# Patient Record
Sex: MALE | Race: WHITE | NOT HISPANIC OR LATINO | Employment: OTHER | URBAN - METROPOLITAN AREA
[De-identification: names, ages, dates, MRNs, and addresses within clinical notes are randomized per-mention and may not be internally consistent; named-entity substitution may affect disease eponyms.]

---

## 2018-01-31 ENCOUNTER — OFFICE VISIT (OUTPATIENT)
Dept: UROLOGY | Facility: CLINIC | Age: 83
End: 2018-01-31
Payer: MEDICARE

## 2018-01-31 VITALS
HEIGHT: 72 IN | HEART RATE: 82 BPM | WEIGHT: 193.31 LBS | SYSTOLIC BLOOD PRESSURE: 179 MMHG | BODY MASS INDEX: 26.18 KG/M2 | DIASTOLIC BLOOD PRESSURE: 78 MMHG

## 2018-01-31 DIAGNOSIS — C61 PROSTATE CA: Primary | ICD-10-CM

## 2018-01-31 DIAGNOSIS — C61 MALIGNANT NEOPLASM OF PROSTATE: Primary | ICD-10-CM

## 2018-01-31 LAB
BILIRUB SERPL-MCNC: NORMAL MG/DL
BLOOD URINE, POC: NORMAL
COLOR, POC UA: YELLOW
GLUCOSE UR QL STRIP: NORMAL
KETONES UR QL STRIP: NORMAL
LEUKOCYTE ESTERASE URINE, POC: NORMAL
NITRITE, POC UA: NORMAL
PH, POC UA: 7
PROTEIN, POC: NORMAL
SPECIFIC GRAVITY, POC UA: 1.01
UROBILINOGEN, POC UA: NORMAL

## 2018-01-31 PROCEDURE — 1159F MED LIST DOCD IN RCRD: CPT | Mod: ,,, | Performed by: UROLOGY

## 2018-01-31 PROCEDURE — 99204 OFFICE O/P NEW MOD 45 MIN: CPT | Mod: S$PBB,25,, | Performed by: UROLOGY

## 2018-01-31 PROCEDURE — 96402 CHEMO HORMON ANTINEOPL SQ/IM: CPT | Mod: ,,, | Performed by: UROLOGY

## 2018-01-31 PROCEDURE — 99203 OFFICE O/P NEW LOW 30 MIN: CPT | Mod: PBBFAC,PO | Performed by: UROLOGY

## 2018-01-31 PROCEDURE — 99999 PR PBB SHADOW E&M-NEW PATIENT-LVL III: CPT | Mod: PBBFAC,,, | Performed by: UROLOGY

## 2018-01-31 PROCEDURE — 81002 URINALYSIS NONAUTO W/O SCOPE: CPT | Mod: PBBFAC,PO | Performed by: UROLOGY

## 2018-01-31 PROCEDURE — 1126F AMNT PAIN NOTED NONE PRSNT: CPT | Mod: ,,, | Performed by: UROLOGY

## 2018-01-31 RX ADMIN — TRIPTORELIN PAMOATE: KIT at 01:01

## 2018-01-31 NOTE — PROGRESS NOTES
UROLOGY [place]  1 31 18    c-c prostate ca    Age 89, comes in accompanied by his daughter in law, job. Pt was a pt of dr josie santana since 2006. Pt was found to have a very high psa level and has been on lupron shots every 3 months.     On feb 8, 2012 he had a cystoscopy for gross hematuria and it was seen that the source of bleeding was the prostate. At the time he had a prostate needle biopsy and the path report from [facility] described hyalinized prostatic stroma with atypical hyperplasia, and intraepithelial neoplasia, but no invasive carcinoma.     psa level on 9 20 10 was 1480  nov 2010 it was 30  mar 2011 it was 2  may 2014 it was 22  dec 2014 it was 60  jan 2015 it was 78  aug 2017 it was 38.    PMH    Surgical:  has a past surgical history that includes Hip pinning and Coronary stent placement.    Medical:  has a past medical history of Diabetes; Elevated cholesterol; Hypertension; Hypothyroid; and Malignant neoplasm of prostate.    Familial: sister and mother had diabetes    Social: retired , , lives in [place]    Current Outpatient Prescriptions on File Prior to Visit   Medication Sig Dispense Refill    amlodipine (NORVASC) 10 MG tablet TAKE ONE TABLET BY MOUTH DAILY 90 tablet 3    dutasteride (AVODART) 0.5 mg capsule TAKE ONE CAPS 30 capsule 9    fenofibrate 160 MG Tab TAKE ONE  30 tablet 3    ferrous sulfate 325 (65 FE) MG EC tablet Take 325 mg by mouth once daily.      hydrALAZINE (APRESOLINE) 50 MG tablet Take 1 tablet (50 mg total) by mouth 3 . 90 tablet 11    levothyroxine (SYNTHROID) 25 MCG tablet Take 1 tablet (25 mcg total) by mouth 30 tablet 6    metoprolol tartrate (LOPRESSOR) 50 MG tablet TAKE ONE-HALF TABLET 30 tablet 3    multivitamin capsule Take 1      nateglinide (STARLIX) 120 MG tablet Take 120 mg by mouth 2 (tw      simvastatin (ZOCOR) 20 MG tablet TAKE ONE TABLET BY MOUTH DAILY 30 tablet 10     Pt alert, hard of hearing, no distress  HEENT:   wnl.  Neck: supple, no JVD, no lymphadenopathy  Chest: CV NSR  Lungs: normal chest expansion  Abdomen flat, nontender, no organomegaly, no masses.  No hernias  Penis nl, meatus nl  Testes nl, epi nl, scrotum nl  NICOLLE: anus nl, sphincter nl tone, mucosa without lesions, prostate flat, no nodules or indurations  Extremities: no edema, peripheral pulses nl  Neuro: preserved    IMP  Prostate cancer, metastatic  Has done well for many years on lupron injections, which he gets every 3 months.  We did not find a tissue confirmation of malignancy in the path report, however the value of psa is pathognomonic of prostate cancer and pt will become 89 in 3 months.  I suggest we continue with treatment with trelstar 3-month preparation  Will update the psa today  May eventually request dr munoz's opinion                                              Trelstar 11.25 mg given by me in Right Gluteus IM.

## 2018-05-03 ENCOUNTER — TELEPHONE (OUTPATIENT)
Dept: UROLOGY | Facility: CLINIC | Age: 83
End: 2018-05-03

## 2018-05-03 NOTE — TELEPHONE ENCOUNTER
----- Message from Sujata Gilliam sent at 5/3/2018  8:53 AM CDT -----  Contact: Tania Carvalho,Elizabeth Carvalho,Elizabeth calling states the pt is there and told them that the Dr wants him to do labs,but they have no orders,pt told them that the office was faxing it to them already....[phone] ext [phone]

## 2018-05-08 ENCOUNTER — OFFICE VISIT (OUTPATIENT)
Dept: UROLOGY | Facility: CLINIC | Age: 83
End: 2018-05-08
Payer: MEDICARE

## 2018-05-08 VITALS
RESPIRATION RATE: 18 BRPM | SYSTOLIC BLOOD PRESSURE: 181 MMHG | BODY MASS INDEX: 25.32 KG/M2 | WEIGHT: 186.94 LBS | DIASTOLIC BLOOD PRESSURE: 75 MMHG | HEART RATE: 71 BPM | HEIGHT: 72 IN

## 2018-05-08 DIAGNOSIS — C61 PROSTATE CA: Primary | ICD-10-CM

## 2018-05-08 PROCEDURE — 99999 PR PBB SHADOW E&M-EST. PATIENT-LVL III: CPT | Mod: PBBFAC,,, | Performed by: UROLOGY

## 2018-05-08 PROCEDURE — 96402 CHEMO HORMON ANTINEOPL SQ/IM: CPT | Mod: S$PBB,,, | Performed by: UROLOGY

## 2018-05-08 PROCEDURE — 99213 OFFICE O/P EST LOW 20 MIN: CPT | Mod: S$PBB,25,, | Performed by: UROLOGY

## 2018-05-08 PROCEDURE — 99213 OFFICE O/P EST LOW 20 MIN: CPT | Mod: PBBFAC,PO | Performed by: UROLOGY

## 2018-05-08 RX ADMIN — TRIPTORELIN PAMOATE: KIT at 11:05

## 2018-05-08 NOTE — PROGRESS NOTES
UROLOGY [place]  5 8 18    C=c follow up prostate ca    Age 89, comes in accompanied by his daughter in law, job. Pt was a pt of dr josie santana since 2006. Pt was found to have a very high psa level and has been on lupron shots every 3 months.      On feb 8, 2012 he had a cystoscopy for gross hematuria and it was seen that the source of bleeding was the prostate. At the time he had a prostate needle biopsy and the path report from [facility] described hyalinized prostatic stroma with atypical hyperplasia, and intraepithelial neoplasia, but no invasive carcinoma.      psa level on 9 20 10 was 1480  nov 2010 it was 30  mar 2011 it was 2  may 2014 it was 22  dec 2014 it was 60  jan 2015 it was 78  aug 2017 it was 38.     PMH     Surgical:  has a past surgical history that includes Hip pinning and Coronary stent placement.     Medical:  has a past medical history of Diabetes; Elevated cholesterol; Hypertension; Hypothyroid; and Malignant neoplasm of prostate.     Familial: sister and mother had diabetes     Social: retired , , lives in [place]            Current Outpatient Prescriptions on File Prior to Visit   Medication Sig Dispense Refill    amlodipine (NORVASC) 10 MG tablet TAKE ONE TABLET BY MOUTH DAILY 90 tablet 3    dutasteride (AVODART) 0.5 mg capsule TAKE ONE CAPS 30 capsule 9    fenofibrate 160 MG Tab TAKE ONE  30 tablet 3    ferrous sulfate 325 (65 FE) MG EC tablet Take 325 mg by mouth once daily.        hydrALAZINE (APRESOLINE) 50 MG tablet Take 1 tablet (50 mg total) by mouth 3 . 90 tablet 11    levothyroxine (SYNTHROID) 25 MCG tablet Take 1 tablet (25 mcg total) by mouth 30 tablet 6    metoprolol tartrate (LOPRESSOR) 50 MG tablet TAKE ONE-HALF TABLET 30 tablet 3    multivitamin capsule Take 1        nateglinide (STARLIX) 120 MG tablet Take 120 mg by mouth 2 (tw        simvastatin (ZOCOR) 20 MG tablet TAKE ONE TABLET BY MOUTH DAILY 30 tablet 10      Pt alert, hard of hearing,  no distress  HEENT:  wnl.  Neck: supple, no JVD, no lymphadenopathy  Chest: CV NSR  Lungs: normal chest expansion  Abdomen flat, nontender, no organomegaly, no masses.  No hernias  Penis nl, meatus nl  Testes nl, epi nl, scrotum nl  NICOLLE: anus nl, sphincter nl tone, mucosa without lesions, prostate flat, no nodules or indurations  Extremities: no edema, peripheral pulses nl  Neuro: preserved     IMP  Prostate cancer, metastatic  Has done well for many years on lupron injections, which he gets every 3 months.  We did not find a tissue confirmation of malignancy in the path report, however the value of psa is pathognomonic of prostate cancer  I suggest we continue with treatment with trelstar 3-month preparation  psa was updated earlier this month (5 3 18) and it was 1.97 ([facility] in [place], [place])  May eventually request dr munoz's opinion       Trelstar 11.25 mg (three month preparation) given by me in right gluteus without problems.

## 2018-07-24 ENCOUNTER — TELEPHONE (OUTPATIENT)
Dept: UROLOGY | Facility: CLINIC | Age: 83
End: 2018-07-24

## 2018-07-24 NOTE — TELEPHONE ENCOUNTER
----- Message from Ila Murphy sent at 7/24/2018 10:58 AM CDT -----  Contact: Pts daughter in law  Radha Garcia is calling to reschedule pts injection. She can be reached at [phone]

## 2018-08-21 ENCOUNTER — OFFICE VISIT (OUTPATIENT)
Dept: UROLOGY | Facility: CLINIC | Age: 83
End: 2018-08-21
Payer: MEDICARE

## 2018-08-21 DIAGNOSIS — C61 PROSTATE CA: Primary | ICD-10-CM

## 2018-08-21 PROCEDURE — 99999 PR PBB SHADOW E&M-EST. PATIENT-LVL II: CPT | Mod: PBBFAC,,, | Performed by: UROLOGY

## 2018-08-21 PROCEDURE — 99213 OFFICE O/P EST LOW 20 MIN: CPT | Mod: S$PBB,25,, | Performed by: UROLOGY

## 2018-08-21 PROCEDURE — 96402 CHEMO HORMON ANTINEOPL SQ/IM: CPT | Mod: S$PBB,,, | Performed by: UROLOGY

## 2018-08-21 PROCEDURE — 99212 OFFICE O/P EST SF 10 MIN: CPT | Mod: PBBFAC,PO | Performed by: UROLOGY

## 2018-08-21 RX ORDER — IBUPROFEN 200 MG
1 CAPSULE ORAL
COMMUNITY
End: 2018-12-19

## 2018-08-21 RX ORDER — NAPROXEN SODIUM 220 MG/1
81 TABLET, FILM COATED ORAL
COMMUNITY
End: 2018-12-19

## 2018-08-21 RX ADMIN — TRIPTORELIN PAMOATE 11.25 MG: KIT at 11:08

## 2018-08-21 NOTE — PROGRESS NOTES
UROLOGY [place]  8 21 18       C=c follow up prostate ca     Age 89, was here 3 months ago for his last injection of 3-month trelstar.     Pt was followed by dr josie santana since 2006. Pt was found to have a very high psa level and has been on lupron shots every 3 months.      On feb 8, 2012 he had a cystoscopy for gross hematuria and it was seen that the source of bleeding was the prostate. At the time he had a prostate needle biopsy and the path report from [facility] described hyalinized prostatic stroma with atypical hyperplasia, and intraepithelial neoplasia, but no invasive carcinoma.      psa level on 9 20 10 was 1480  nov 2010 it was 30  mar 2011 it was 2  may 2014 it was 22  dec 2014 it was 60  jan 2015 it was 78  aug 2017 it was 38.     PMH     Surgical:  has a past surgical history that includes Hip pinning and Coronary stent placement.     Medical:  has a past medical history of Diabetes; Elevated cholesterol; Hypertension; Hypothyroid; and Malignant neoplasm of prostate.     Familial: sister and mother had diabetes     Social: retired , , lives in [place]                 Current Outpatient Prescriptions on File Prior to Visit   Medication Sig Dispense Refill    amlodipine (NORVASC) 10 MG  TAKE ONE TABLET  90 tablet 3    dutasteride (AVODART) 0.5  TAKE ONE CAPS 30 capsule 9    fenofibrate 160 MG Tab TAKE ONE  30 tablet 3    ferrous sulfate 325 (65 FE) M Take 325 mg b        hydrALAZINE (APRESOLINE) 50 Take 1 tablet (50 mg  90 tablet 11    levothyroxine (SYNTHROID) 2 Take 1 tablet (25 mc 30 tablet 6    metoprolol tartrate (LOPRESSOR) 50 MG tablet TAKE ONE-HALF TABLET 30 tablet 3    multivitamin capsule Take 1        nateglinide (STARLIX) 120 Take 120 m        simvastatin (ZOCOR) 20  TAKE ONE TABLET 30 tablet 10      Pt alert, hard of hearing, no distress  HEENT:  wnl.  Neck: supple, no JVD, no lymphadenopathy  Chest: CV NSR  Lungs: normal chest expansion  Abdomen flat,  nontender, no organomegaly, no masses.  No hernias  Penis nl, meatus nl  Testes nl, epi nl, scrotum nl  NICOLLE: anus nl, sphincter nl tone, mucosa without lesions, prostate flat, no nodules or indurations  Extremities: no edema, peripheral pulses nl  Neuro: preserved     IMP  Prostate cancer, metastatic  Has done well for many years on lupron injections, which he gets every 3 months.  We did not find a tissue confirmation of malignancy in the path report, however the value of psa is pathognomonic of prostate cancer  I suggest we continue with treatment with trelstar 3-month preparation  psa was updated earlier this year (5 3 18) and it was 1.97 ([facility] in [place], [place])  May eventually request dr munoz's opinion      Trelstar 11.25 mg (three month preparation) given by me in right gluteus without problems.  psa update for next time

## 2018-09-25 ENCOUNTER — TELEPHONE (OUTPATIENT)
Dept: UROLOGY | Facility: CLINIC | Age: 83
End: 2018-09-25

## 2018-09-25 DIAGNOSIS — C61 MALIGNANT NEOPLASM OF PROSTATE: Primary | ICD-10-CM

## 2018-09-25 NOTE — TELEPHONE ENCOUNTER
Spoke to pt's daughter in law and scheduled pt for his apt to see dr polanco for his 3 moth trelstar injection.

## 2018-09-25 NOTE — TELEPHONE ENCOUNTER
----- Message from Risa Yi sent at 9/25/2018  9:32 AM CDT -----  Contact: Pt's daughter in law Radha Garcia is calling in regards to scheduling pt's injection appt for November.    Radha would like a call back at [phone].    Thank you

## 2018-10-22 ENCOUNTER — TELEPHONE (OUTPATIENT)
Dept: UROLOGY | Facility: CLINIC | Age: 83
End: 2018-10-22

## 2018-10-22 NOTE — TELEPHONE ENCOUNTER
----- Message from Sujata Gilliam sent at 10/22/2018  1:23 PM CDT -----  Contact: Daughter-in-law  Pt daughter-in-law Radha devlin wanting to schedule pt PSA CANCER INJ for 2/14/19 ,please..[phone] (home)

## 2018-11-14 ENCOUNTER — OFFICE VISIT (OUTPATIENT)
Dept: UROLOGY | Facility: CLINIC | Age: 83
End: 2018-11-14
Payer: MEDICARE

## 2018-11-14 VITALS
SYSTOLIC BLOOD PRESSURE: 169 MMHG | HEIGHT: 72 IN | BODY MASS INDEX: 25.71 KG/M2 | WEIGHT: 189.81 LBS | DIASTOLIC BLOOD PRESSURE: 77 MMHG | HEART RATE: 69 BPM

## 2018-11-14 DIAGNOSIS — C61 MALIGNANT NEOPLASM OF PROSTATE: Primary | ICD-10-CM

## 2018-11-14 PROCEDURE — 99213 OFFICE O/P EST LOW 20 MIN: CPT | Mod: S$PBB,25,, | Performed by: UROLOGY

## 2018-11-14 PROCEDURE — 99999 PR PBB SHADOW E&M-EST. PATIENT-LVL III: CPT | Mod: PBBFAC,,, | Performed by: UROLOGY

## 2018-11-14 PROCEDURE — 99213 OFFICE O/P EST LOW 20 MIN: CPT | Mod: PBBFAC,PO | Performed by: UROLOGY

## 2018-11-14 PROCEDURE — 96402 CHEMO HORMON ANTINEOPL SQ/IM: CPT | Mod: S$PBB,,, | Performed by: UROLOGY

## 2018-11-14 RX ADMIN — TRIPTORELIN PAMOATE 11.25 MG: KIT at 07:11

## 2018-11-14 NOTE — PROGRESS NOTES
UROLOGY [place]  11 14 18     C=c follow up prostate ca     Age 89, was here 3 months ago for his last injection of 3-month trelstar.      Pt was followed by dr josie santana since 2006. Pt was found to have a very high psa level and has been on lupron shots every 3 months.      In 2012 he had a cystoscopy for gross hematuria and it was seen that the source of bleeding was the prostate. At the time he had a prostate needle biopsy and the path report from [facility] described hyalinized prostatic stroma with atypical hyperplasia, and intraepithelial neoplasia, but no invasive carcinoma.      psa level on 9 20 10 was 1480  nov 2010 it was 30  mar 2011 it was 2  may 2014 it was 22  dec 2014 it was 60  jan 2015 it was 78  aug 2017 it was 38.  2 weeks ago 2.4     PMH     Surgical:  has a past surgical history that includes Hip pinning and Coronary stent placement.     Medical:  has a past medical history of Diabetes; Elevated cholesterol; Hypertension; Hypothyroid; and Malignant neoplasm of prostate.     Familial: sister and mother had diabetes     Social: retired , , lives in [place]                 Current Outpatient Prescriptions on File Prior to Visit   Medication Sig Dispense Refill    amlodipine (NORVASC) 10 MG  TAKE ONE TABLET  90 tablet 3    dutasteride (AVODART) 0.5  TAKE ONE CAPS 30 capsule 9    fenofibrate 160 MG Tab TAKE ONE  30 tablet 3    ferrous sulfate 325 (65 FE) M Take 325 mg b        hydrALAZINE (APRESOLINE) 50 Take 1 tablet (50 mg  90 tablet 11    levothyroxine (SYNTHROID) 2 Take 1 tablet (25 mc 30 tablet 6    metoprolol tartrate (LOPRESSOR) 50 MG tablet TAKE ONE-HALF TABLET 30 tablet 3    multivitamin capsule Take 1        nateglinide (STARLIX) 120 Take 120 m        simvastatin (ZOCOR) 20  TAKE ONE TABLET 30 tablet 10      Pt alert, hard of hearing, no distress  HEENT:  wnl.  Neck: supple, no JVD, no lymphadenopathy  Chest: CV NSR  Lungs: normal chest expansion  Abdomen  flat, nontender, no organomegaly, no masses.  No hernias  Penis nl, meatus nl  Testes nl, epi nl, scrotum nl  Extremities: no edema, peripheral pulses nl  Neuro: preserved     IMP  Prostate cancer, metastatic  Has done well for many years on lupron injections, which he gets every 3 months.  We did not find a tissue confirmation of malignancy in the path report, however the value of psa is pathognomonic of prostate cancer  I suggest we continue with treatment with trelstar 3-month preparation      Trelstar 11.25 mg (three month preparation) given by me today in R glutteal area lot [id number] exp 3/20  psa update for next time

## 2019-02-13 ENCOUNTER — OFFICE VISIT (OUTPATIENT)
Dept: UROLOGY | Facility: CLINIC | Age: 84
End: 2019-02-13
Payer: MEDICARE

## 2019-02-13 VITALS
HEART RATE: 70 BPM | WEIGHT: 189.13 LBS | HEIGHT: 72 IN | BODY MASS INDEX: 25.62 KG/M2 | DIASTOLIC BLOOD PRESSURE: 70 MMHG | SYSTOLIC BLOOD PRESSURE: 175 MMHG

## 2019-02-13 DIAGNOSIS — C61 MALIGNANT NEOPLASM OF PROSTATE: Primary | ICD-10-CM

## 2019-02-13 PROCEDURE — 96402 PR CHEMOTHER HORMON ANTINEOPL SUB-Q/IM: ICD-10-PCS | Mod: S$PBB,,, | Performed by: UROLOGY

## 2019-02-13 PROCEDURE — 99999 PR PBB SHADOW E&M-EST. PATIENT-LVL III: CPT | Mod: PBBFAC,,, | Performed by: UROLOGY

## 2019-02-13 PROCEDURE — 99213 PR OFFICE/OUTPT VISIT, EST, LEVL III, 20-29 MIN: ICD-10-PCS | Mod: S$PBB,25,, | Performed by: UROLOGY

## 2019-02-13 PROCEDURE — 99213 OFFICE O/P EST LOW 20 MIN: CPT | Mod: S$PBB,25,, | Performed by: UROLOGY

## 2019-02-13 PROCEDURE — 99213 OFFICE O/P EST LOW 20 MIN: CPT | Mod: PBBFAC,PO | Performed by: UROLOGY

## 2019-02-13 PROCEDURE — 96402 CHEMO HORMON ANTINEOPL SQ/IM: CPT | Mod: S$PBB,,, | Performed by: UROLOGY

## 2019-02-13 PROCEDURE — 99999 PR PBB SHADOW E&M-EST. PATIENT-LVL III: ICD-10-PCS | Mod: PBBFAC,,, | Performed by: UROLOGY

## 2019-02-13 RX ADMIN — TRIPTORELIN PAMOATE 11.25 MG: KIT at 07:02

## 2019-02-13 NOTE — PROGRESS NOTES
UROLOGY [place]  2 13 19     C=c follow up prostate ca     Age 89, was here 3 months ago for his last injection of 3-month trelstar.      Pt was followed by dr josie santana since 2006. Pt was found to have a very high psa level and has been on lupron shots every 3 months.      In 2012 he had a cystoscopy for gross hematuria and it was seen that the source of bleeding was the prostate. At the time he had a prostate needle biopsy and the path report from [facility] described hyalinized prostatic stroma with atypical hyperplasia, and intraepithelial neoplasia, but no invasive carcinoma.      psa level on 9 20 10 was 1480  nov 2010 it was 30  mar 2011 it was 2  may 2014 it was 22  dec 2014 it was 60  jan 2015 it was 78  aug 2017 it was 38.  Oct 2018 it was 2.4     PMH     Surgical:  has a past surgical history that includes Hip pinning and Coronary stent placement.     Medical:  has a past medical history of Diabetes; Elevated cholesterol; Hypertension; Hypothyroid; and Malignant neoplasm of prostate.     Familial: sister and mother had diabetes     Social: retired , , lives in [place]                 Current Outpatient Prescriptions on File Prior to Visit   Medication Sig Dispense Refill    amlodipine (NORVASC) 10 MG  TAKE ONE TABLET  90 tablet 3    dutasteride (AVODART) 0.5  TAKE ONE CAPS 30 capsule 9    fenofibrate 160 MG Tab TAKE ONE  30 tablet 3    ferrous sulfate 325 (65 FE) M Take 325 mg b        hydrALAZINE (APRESOLINE) 50 Take 1 tablet (50 mg  90 tablet 11    levothyroxine (SYNTHROID) 2 Take 1 tablet (25 mc 30 tablet 6    metoprolol tartrate (LOPRESSOR) 50 MG tablet TAKE ONE-HALF TABLET 30 tablet 3    multivitamin capsule Take 1        nateglinide (STARLIX) 120 Take 120 m        simvastatin (ZOCOR) 20  TAKE ONE TABLET 30 tablet 10      Pt alert, hard of hearing, no distress  HEENT:  wnl.  Neck: supple, no JVD, no lymphadenopathy  Chest: CV NSR  Lungs: normal chest expansion  Abdomen  flat, nontender, no organomegaly, no masses.  No hernias  Penis nl, meatus nl  Testes nl, epi nl, scrotum nl  Extremities: no edema, peripheral pulses nl  Neuro: preserved     IMP  Prostate cancer, metastatic  Has done well for many years on lupron injections, which he gets every 3 months.  We did not find a tissue confirmation of malignancy in the path report, however the value of psa has been pathognomonic of prostate cancer  I suggest we continue with treatment with trelstar 3-month preparation      Trelstar 11.25 mg (three month preparation) given by me today in R glutteal area lot [id number] exp 8/19  psa update for next time

## 2019-05-15 ENCOUNTER — OFFICE VISIT (OUTPATIENT)
Dept: UROLOGY | Facility: CLINIC | Age: 84
End: 2019-05-15
Payer: MEDICARE

## 2019-05-15 ENCOUNTER — TELEPHONE (OUTPATIENT)
Dept: UROLOGY | Facility: CLINIC | Age: 84
End: 2019-05-15

## 2019-05-15 DIAGNOSIS — C61 MALIGNANT NEOPLASM OF PROSTATE: Primary | ICD-10-CM

## 2019-05-15 PROCEDURE — 99213 PR OFFICE/OUTPT VISIT, EST, LEVL III, 20-29 MIN: ICD-10-PCS | Mod: S$PBB,25,, | Performed by: UROLOGY

## 2019-05-15 PROCEDURE — 99213 OFFICE O/P EST LOW 20 MIN: CPT | Mod: S$PBB,25,, | Performed by: UROLOGY

## 2019-05-15 PROCEDURE — 99999 PR PBB SHADOW E&M-EST. PATIENT-LVL II: CPT | Mod: PBBFAC,,, | Performed by: UROLOGY

## 2019-05-15 PROCEDURE — 99999 PR PBB SHADOW E&M-EST. PATIENT-LVL II: ICD-10-PCS | Mod: PBBFAC,,, | Performed by: UROLOGY

## 2019-05-15 PROCEDURE — 99212 OFFICE O/P EST SF 10 MIN: CPT | Mod: PBBFAC,PO | Performed by: UROLOGY

## 2019-05-15 RX ADMIN — LEUPROLIDE ACETATE 45 MG: KIT at 02:05

## 2019-05-15 NOTE — TELEPHONE ENCOUNTER
----- Message from Omari Mcnally sent at 5/15/2019  2:13 PM CDT -----  Contact: Radhaeve Yarbroughon - Daughter  Type:  Patient Returning Call    Who Called:  Radha  Who Left Message for Patient:  unknown  Does the patient know what this is regarding?:  unknown  Best Call Back Number:  [phone]  Additional Information:  NA

## 2019-05-15 NOTE — TELEPHONE ENCOUNTER
----- Message from Unruly Murray sent at 5/15/2019  3:26 PM CDT -----  Contact: pt  Type: Needs Medical Advice    Who Called:  pt    Best Call Back Number: [phone]  Additional Information: pt was on the phone with someone regarding an appointment and went through a deadzone and lost signal.

## 2019-05-15 NOTE — PROGRESS NOTES
UROLOGY [place]  5 15 19     Cc follow up prostate ca     Age 89, receiving the lupron injection periodically; generally approved by his health insurance company for q3 month preparations.       Pt was followed by dr josie santana since 2006. was found to have a very high psa level and has been on lupron shots every 3 months since then.      In 2012 he had a cystoscopy for gross hematuria and it was seen that the source of bleeding was the prostate. At the time he had a prostate needle biopsy and the path report from [facility] described hyalinized prostatic stroma with atypical hyperplasia, and intraepithelial neoplasia, but no invasive carcinoma.      psa level on 9 20 10 was 1480  nov 2010 it was 30  mar 2011 it was 2  may 2014 it was 22  dec 2014 it was 60  jan 2015 it was 78  aug 2017 it was 38.  Oct 2018 it was 2.4     PMH     Surgical:  has a past surgical history that includes Hip pinning and Coronary stent placement.     Medical:  has a past medical history of Diabetes; Elevated cholesterol; Hypertension; Hypothyroid; and Malignant neoplasm of prostate.     Familial: sister and mother had diabetes     Social: retired , , lives in [place]                 Current Outpatient Prescriptions on File Prior to Visit   Medication Sig Dispense Refill    amlodipine (NORVASC) 10 MG  TAKE ONE TABLET  90 tablet 3    dutasteride (AVODART) 0.5  TAKE ONE CAPS 30 capsule 9    fenofibrate 160 MG Tab TAKE ONE  30 tablet 3    ferrous sulfate 325 (65 FE) M Take 325 mg b        hydrALAZINE (APRESOLINE) 50 Take 1 tablet (50 mg  90 tablet 11    levothyroxine (SYNTHROID) 2 Take 1 tablet (25 mc 30 tablet 6    metoprolol tartrate (LOPRESSOR) 50 MG tablet TAKE ONE-HALF TABLET 30 tablet 3    multivitamin capsule Take 1        nateglinide (STARLIX) 120 Take 120 m        simvastatin (ZOCOR) 20  TAKE ONE TABLET 30 tablet 10      Pt alert, hard of hearing, no distress  HEENT:  wnl.  Neck: supple, no JVD, no  lymphadenopathy  Chest: CV NSR  Lungs: normal chest expansion  Abdomen flat, nontender, no organomegaly, no masses.  No hernias  Penis nl, meatus nl  Testes nl, epi nl, scrotum nl  Extremities: no edema, peripheral pulses nl  Neuro: preserved     IMP  Prostate cancer, metastatic  Has done well for many years on lupron injections, which he gets every 3 months.  We did not find a tissue confirmation of malignancy in the path report, however the value of psa has been pathognomonic of prostate cancer  I suggest we continue with treatment with LHRH analogue    ------------    Insurance company approved for 6 month preparation.  I personally gave pt one shot of lupron depot 45 mg (6 mo), lot [id number]  L hip, tolerated well.     RTC 6 months

## 2019-05-15 NOTE — TELEPHONE ENCOUNTER
Spoke to pt's daughter-in-law. Trying to set up pt for his next injection for in 6 months. Pt's apt did not have a referral linked for his injection so that is why he had to wait a little bit longer today until we could get the authorization. Call dropped and left message.

## 2019-06-17 PROBLEM — I35.0 MODERATE AORTIC STENOSIS: Status: ACTIVE | Noted: 2019-06-17

## 2019-11-20 ENCOUNTER — OFFICE VISIT (OUTPATIENT)
Dept: UROLOGY | Facility: CLINIC | Age: 84
End: 2019-11-20
Payer: MEDICARE

## 2019-11-20 VITALS
BODY MASS INDEX: 25.53 KG/M2 | HEART RATE: 74 BPM | SYSTOLIC BLOOD PRESSURE: 198 MMHG | WEIGHT: 188.5 LBS | HEIGHT: 72 IN | DIASTOLIC BLOOD PRESSURE: 77 MMHG

## 2019-11-20 DIAGNOSIS — C61 PROSTATE CA: Primary | ICD-10-CM

## 2019-11-20 LAB
BILIRUB SERPL-MCNC: NORMAL MG/DL
BLOOD URINE, POC: NORMAL
COLOR, POC UA: YELLOW
GLUCOSE UR QL STRIP: NORMAL
KETONES UR QL STRIP: NORMAL
LEUKOCYTE ESTERASE URINE, POC: NORMAL
NITRITE, POC UA: NORMAL
PH, POC UA: 5
PROTEIN, POC: NORMAL
SPECIFIC GRAVITY, POC UA: 1.01
UROBILINOGEN, POC UA: NORMAL

## 2019-11-20 PROCEDURE — 99213 OFFICE O/P EST LOW 20 MIN: CPT | Mod: S$PBB,,, | Performed by: UROLOGY

## 2019-11-20 PROCEDURE — 1159F PR MEDICATION LIST DOCUMENTED IN MEDICAL RECORD: ICD-10-PCS | Mod: ,,, | Performed by: UROLOGY

## 2019-11-20 PROCEDURE — 1159F MED LIST DOCD IN RCRD: CPT | Mod: ,,, | Performed by: UROLOGY

## 2019-11-20 PROCEDURE — 99213 OFFICE O/P EST LOW 20 MIN: CPT | Mod: PBBFAC,PO | Performed by: UROLOGY

## 2019-11-20 PROCEDURE — 81002 URINALYSIS NONAUTO W/O SCOPE: CPT | Mod: PBBFAC,PO | Performed by: UROLOGY

## 2019-11-20 PROCEDURE — 1126F AMNT PAIN NOTED NONE PRSNT: CPT | Mod: ,,, | Performed by: UROLOGY

## 2019-11-20 PROCEDURE — 99999 PR PBB SHADOW E&M-EST. PATIENT-LVL III: CPT | Mod: PBBFAC,,, | Performed by: UROLOGY

## 2019-11-20 PROCEDURE — 1126F PR PAIN SEVERITY QUANTIFIED, NO PAIN PRESENT: ICD-10-PCS | Mod: ,,, | Performed by: UROLOGY

## 2019-11-20 PROCEDURE — 99999 PR PBB SHADOW E&M-EST. PATIENT-LVL III: ICD-10-PCS | Mod: PBBFAC,,, | Performed by: UROLOGY

## 2019-11-20 PROCEDURE — 99213 PR OFFICE/OUTPT VISIT, EST, LEVL III, 20-29 MIN: ICD-10-PCS | Mod: S$PBB,,, | Performed by: UROLOGY

## 2019-11-20 RX ADMIN — LEUPROLIDE ACETATE 45 MG: KIT at 10:11

## 2020-03-09 NOTE — PROGRESS NOTES
UROLOGY [place]  11 20 19     Cc follow up prostate ca     Age 89, receiving the lupron injection periodically; sometimes every 3 months and sometimes every 6 months.      Pt was followed by dr josie santana since 2006. was found to have a very high psa level and has been on lupron shots since then.      In 2012 he had a cystoscopy for gross hematuria and it was seen that the source of bleeding was the prostate. At the time he had a prostate needle biopsy and the path report from [facility] described hyalinized prostatic stroma with atypical hyperplasia, and intraepithelial neoplasia, but no invasive carcinoma.      psa level on 9 20 10 was 1480  nov 2010 it was 30  mar 2011 it was 2  may 2014 it was 22  dec 2014 it was 60  jan 2015 it was 78  aug 2017 it was 38.  Oct 2018 it was 2.4     PMH     Surgical:  has a past surgical history that includes Hip pinning and Coronary stent placement.     Medical:  has a past medical history of Diabetes; Elevated cholesterol; Hypertension; Hypothyroid; and Malignant neoplasm of prostate.     Familial: sister and mother had diabetes     Social: retired , , lives in [place]                 Current Outpatient Prescriptions on File Prior to Visit   Medication Sig Dispense Refill    amlodipine (NORVASC) 10 MG  TAKE ONE TABLET  90 tablet 3    dutasteride (AVODART) 0.5  TAKE ONE CAPS 30 capsule 9    fenofibrate 160 MG Tab TAKE ONE  30 tablet 3    ferrous sulfate 325 (65 FE) M Take 325 mg b        hydrALAZINE (APRESOLINE) 50 Take 1 tablet (50 mg  90 tablet 11    levothyroxine (SYNTHROID) 2 Take 1 tablet (25 mc 30 tablet 6    metoprolol tartrate (LOPRESSOR) 50 MG tablet TAKE ONE-HALF TABLET 30 tablet 3    multivitamin capsule Take 1        nateglinide (STARLIX) 120 Take 120 m        simvastatin (ZOCOR) 20  TAKE ONE TABLET 30 tablet 10      Pt alert, hard of hearing, no distress  HEENT:  wnl.  Neck: supple, no JVD, no lymphadenopathy  Chest: CV NSR  Lungs:  normal chest expansion  Abdomen flat, nontender, no organomegaly, no masses.  No hernias  Penis phimotic  Testes nl, epi nl, scrotum nl  NICOLLE decreased sphincter tone, no mucosal lesions, prostate flat, no indurations detectable.  Extremities: no edema, peripheral pulses nl  Neuro: preserved     IMP  Prostate cancer, metastatic  Has done well for many years on lupron injections, which he is currently getting q 6 mo.  We did not find a tissue confirmation of malignancy in the path report, however the value of psa has been pathognomonic of prostate cancer  I suggest we continue with treatment with LHRH analogue     ------------     Insurance company approved for 6 month preparation.  I personally gave pt one shot of lupron depot 45 mg (6 mo), lot [id number], exp [id number] injected im into L hip, tolerated well.   psa now.  RTC 6 months with psa update     no recurring pneumonia/no recurring infections/no persistent infections

## 2020-04-30 ENCOUNTER — TELEPHONE (OUTPATIENT)
Dept: UROLOGY | Facility: CLINIC | Age: 85
End: 2020-04-30

## 2020-04-30 NOTE — TELEPHONE ENCOUNTER
----- Message from Maricarmen Sharp sent at 4/30/2020 12:35 PM CDT -----  Type: Needs Medical Advice  Who Called: Radha/ daughter in law  Best Call Back Number: [phone]  Additional Information: please give call back to schedule shot.

## 2020-05-20 ENCOUNTER — LAB VISIT (OUTPATIENT)
Dept: LAB | Facility: HOSPITAL | Age: 85
End: 2020-05-20
Attending: UROLOGY
Payer: MEDICARE

## 2020-05-20 ENCOUNTER — OFFICE VISIT (OUTPATIENT)
Dept: UROLOGY | Facility: CLINIC | Age: 85
End: 2020-05-20
Payer: MEDICARE

## 2020-05-20 VITALS
HEART RATE: 62 BPM | WEIGHT: 186.94 LBS | SYSTOLIC BLOOD PRESSURE: 116 MMHG | DIASTOLIC BLOOD PRESSURE: 66 MMHG | HEIGHT: 72 IN | BODY MASS INDEX: 25.32 KG/M2

## 2020-05-20 DIAGNOSIS — C61 PROSTATE CA: Primary | ICD-10-CM

## 2020-05-20 DIAGNOSIS — C61 PROSTATE CA: ICD-10-CM

## 2020-05-20 PROCEDURE — 84153 ASSAY OF PSA TOTAL: CPT

## 2020-05-20 PROCEDURE — 99999 PR PBB SHADOW E&M-EST. PATIENT-LVL III: ICD-10-PCS | Mod: PBBFAC,,, | Performed by: UROLOGY

## 2020-05-20 PROCEDURE — 99213 PR OFFICE/OUTPT VISIT, EST, LEVL III, 20-29 MIN: ICD-10-PCS | Mod: 25,S$GLB,, | Performed by: UROLOGY

## 2020-05-20 PROCEDURE — 96402 PR CHEMOTHER HORMON ANTINEOPL SUB-Q/IM: ICD-10-PCS | Mod: S$GLB,,, | Performed by: UROLOGY

## 2020-05-20 PROCEDURE — 99213 OFFICE O/P EST LOW 20 MIN: CPT | Mod: 25,S$GLB,, | Performed by: UROLOGY

## 2020-05-20 PROCEDURE — 99213 OFFICE O/P EST LOW 20 MIN: CPT | Mod: PBBFAC,PO | Performed by: UROLOGY

## 2020-05-20 PROCEDURE — 36415 COLL VENOUS BLD VENIPUNCTURE: CPT | Mod: PO

## 2020-05-20 PROCEDURE — 96402 CHEMO HORMON ANTINEOPL SQ/IM: CPT | Mod: S$GLB,,, | Performed by: UROLOGY

## 2020-05-20 PROCEDURE — 99999 PR PBB SHADOW E&M-EST. PATIENT-LVL III: CPT | Mod: PBBFAC,,, | Performed by: UROLOGY

## 2020-05-20 RX ADMIN — LEUPROLIDE ACETATE 45 MG: KIT at 11:05

## 2020-05-20 NOTE — PROGRESS NOTES
UROLOGY [place]  5 20 20    Cc prostate ca, comes for injection    Age 89, was here nov 2019. Pt receives periodic lupron injections.      Pt was followed by dr josie santana since 2006. was found to have a very high psa level and has been on lupron shots since then.      psa level on 9 20 10 was 1480  nov 2010 it was 30  mar 2011 it was 2  may 2014 it was 22  dec 2014 it was 60  jan 2015 it was 78  aug 2017 it was 38.  Oct 2018 it was 2.4    In 2012 he had a cystoscopy for gross hematuria and it was seen that the source of bleeding was the prostate. At the time he had a prostate needle biopsy and the path report from ST described hyalinized prostatic stroma with atypical hyperplasia, and intraepithelial neoplasia, but no invasive carcinoma.      PMH     Surgical:  has a past surgical history that includes Hip pinning and Coronary stent placement.     Medical:  has a past medical history of Diabetes; Elevated cholesterol; Hypertension; Hypothyroid; and Malignant neoplasm of prostate.     Familial: sister and mother had diabetes     Social: retired , , lives in [place]                 Current Outpatient Prescriptions on File Prior to Visit   Medication Sig Dispense Refill    amlodipine (NORVASC) 10 MG  TAKE ONE TABLET  90 tablet 3    dutasteride (AVODART) 0.5  TAKE ONE CAPS 30 capsule 9    fenofibrate 160 MG Tab TAKE ONE  30 tablet 3    ferrous sulfate 325 (65 FE) M Take 325 mg b        hydrALAZINE (APRESOLINE) 50 Take 1 tablet (50 mg  90 tablet 11    levothyroxine (SYNTHROID) 2 Take 1 tablet (25 mc 30 tablet 6    metoprolol tartrate (LOPRESSOR) 50 MG tablet TAKE ONE-HALF TABLET 30 tablet 3    multivitamin capsule Take 1        nateglinide (STARLIX) 120 Take 120 m        simvastatin (ZOCOR) 20  TAKE ONE TABLET 30 tablet 10      Pt alert, hard of hearing, no distress  HEENT:  wnl.  Neck: supple, no JVD, no lymphadenopathy  Chest: CV NSR  Lungs: normal chest expansion  Abdomen flat,  nontender, no organomegaly, no masses.  No hernias  Penis phimotic  Testes nl, epi nl, scrotum nl  NICOLLE decreased sphincter tone, no mucosal lesions, prostate flat, no indurations detectable.  Extremities: no edema, peripheral pulses nl  Neuro: preserved     IMP  Prostate cancer, metastatic  psa update requested and drawn  Pt is on avodart.  Has done well for many years on lupron injections, which he is currently getting q 6 mo.  We did not find a tissue confirmation of malignancy in the path report, however the value of psa has been pathognomonic of prostate cancer  I suggest we continue with treatment with LHRH analogue    Today I gave him his lupron 45 mg lot [id number], exp 1 June 2022  Pt says he gets no side effects from these shots whatsoever.       RTC 6 mo

## 2020-05-21 LAB — COMPLEXED PSA SERPL-MCNC: 15.5 NG/ML (ref 0–4)
